# Patient Record
Sex: FEMALE | Race: OTHER | NOT HISPANIC OR LATINO | URBAN - METROPOLITAN AREA
[De-identification: names, ages, dates, MRNs, and addresses within clinical notes are randomized per-mention and may not be internally consistent; named-entity substitution may affect disease eponyms.]

---

## 2024-03-28 PROBLEM — Z00.00 ENCOUNTER FOR PREVENTIVE HEALTH EXAMINATION: Status: ACTIVE | Noted: 2024-03-28

## 2024-03-29 ENCOUNTER — OUTPATIENT (OUTPATIENT)
Dept: OUTPATIENT SERVICES | Facility: HOSPITAL | Age: 74
LOS: 1 days | End: 2024-03-29
Payer: MEDICARE

## 2024-03-29 ENCOUNTER — RESULT REVIEW (OUTPATIENT)
Age: 74
End: 2024-03-29

## 2024-03-29 ENCOUNTER — APPOINTMENT (OUTPATIENT)
Dept: ORTHOPEDIC SURGERY | Facility: CLINIC | Age: 74
End: 2024-03-29
Payer: MEDICARE

## 2024-03-29 VITALS
OXYGEN SATURATION: 96 % | SYSTOLIC BLOOD PRESSURE: 144 MMHG | HEIGHT: 62 IN | BODY MASS INDEX: 23.92 KG/M2 | DIASTOLIC BLOOD PRESSURE: 79 MMHG | HEART RATE: 73 BPM | WEIGHT: 130 LBS

## 2024-03-29 PROCEDURE — 73564 X-RAY EXAM KNEE 4 OR MORE: CPT

## 2024-03-29 PROCEDURE — 73564 X-RAY EXAM KNEE 4 OR MORE: CPT | Mod: 26,RT

## 2024-03-29 PROCEDURE — 99203 OFFICE O/P NEW LOW 30 MIN: CPT

## 2024-03-29 NOTE — DISCUSSION/SUMMARY
[de-identified] : Right knee arthritis.  I had a long discussion with the patient regarding knee arthritis / degenerative disease and treatment options. We reviewed the nature and etiology of degenerative knee degenerative disease.  We discussed the spectrum of symptomatic treatments. Our discussion included the use of appropriate exercises, activity modifications, weight reduction and maintenance, appropriate medication use, use of assistive devices, role of injections and avoidance of high impact activities.  We provided a prescription for physical therapy for her.  She can work on this and the other modalities discussed and we will follow-up in 3 months.  Patient is in agreement this plan.  She will hesitate to contact us if there are any significant change or concern.  All questions the patient and daughter who was present for the visit and provided translation at the patient's request answered.  I, Dr. Archuleta, personally performed the evaluation and management (E/M) services for this patient. That E/M includes conducting the clinically appropriate initial history &/or exam, assessing all conditions, and establishing the plan of care. Today, my PAU, AVOB, was here to observe my evaluation and management service for this patient & follow plan of care established by me going forward.

## 2024-03-29 NOTE — PHYSICAL EXAM
[de-identified] : General: Patient is a well-appearing female in no apparent distress. She is alert and oriented x 3. Vital signs are within normal limits.  No sign of fevers or infectious symptoms.   Hygiene: Excellent HEENT: Atraumatic with no asymmetry.  Neck motion is normal. No overt hearing deficits. Pulmonary: Breathing comfortably. Gastrointestinal: Is not obese.   Psych: Responding appropriately with appropriate affect. Patient does not demonstrate tangential thought, perseveration or anxiety. Vascular: No rashes or obvious skin abnormalities in either lower extremity. Capillary refill is <2sec. Good distal perfusion. Neurovascular: Varicose veins absent. 5/5 strength with hip flexion, knee extension, ankle dorsiflexion, ankle plantar flexion, and EHL. Sensation is intact over the lower extremity in L2-S1 nerve distributions. 2+ dorsalis pedis and posterior tibial pulses   [de-identified] : Gait: She walks with a mildly antalgic gait   Inspection: Knee appears unremarkable No ulcerations observed   Palpation: Tenderness to palpation of the medial joint line on the right   Range of Motion:   Right: 0-125   Left: 0-125   Some pain with ROM on the right. Bilateral knee stable to varus/valgus and ant/post stress   Both hips are stable no sign of dislocation or subluxation on exam with good pain free ROM.        [de-identified] : 4 views of the knees show some arthritis with joint space narrowing predominantly affecting the medial compartment but there is no bone-on-bone apposition.

## 2024-06-28 ENCOUNTER — APPOINTMENT (OUTPATIENT)
Dept: ORTHOPEDIC SURGERY | Facility: CLINIC | Age: 74
End: 2024-06-28
Payer: MEDICARE

## 2024-06-28 VITALS
HEIGHT: 62 IN | DIASTOLIC BLOOD PRESSURE: 72 MMHG | OXYGEN SATURATION: 95 % | BODY MASS INDEX: 25.03 KG/M2 | HEART RATE: 85 BPM | SYSTOLIC BLOOD PRESSURE: 123 MMHG | WEIGHT: 136 LBS

## 2024-06-28 DIAGNOSIS — M17.11 UNILATERAL PRIMARY OSTEOARTHRITIS, RIGHT KNEE: ICD-10-CM

## 2024-06-28 PROCEDURE — 99211 OFF/OP EST MAY X REQ PHY/QHP: CPT
